# Patient Record
Sex: FEMALE | Race: WHITE | URBAN - METROPOLITAN AREA
[De-identification: names, ages, dates, MRNs, and addresses within clinical notes are randomized per-mention and may not be internally consistent; named-entity substitution may affect disease eponyms.]

---

## 2023-06-04 ENCOUNTER — OFFICE VISIT (OUTPATIENT)
Dept: URGENT CARE | Age: 23
End: 2023-06-04

## 2023-06-04 VITALS
SYSTOLIC BLOOD PRESSURE: 97 MMHG | OXYGEN SATURATION: 97 % | DIASTOLIC BLOOD PRESSURE: 62 MMHG | RESPIRATION RATE: 16 BRPM | BODY MASS INDEX: 20.57 KG/M2 | HEIGHT: 66 IN | TEMPERATURE: 98.1 F | WEIGHT: 128 LBS | HEART RATE: 71 BPM

## 2023-06-04 DIAGNOSIS — M79.672 LEFT FOOT PAIN: ICD-10-CM

## 2023-06-04 DIAGNOSIS — S92.155A CLOSED NONDISPLACED AVULSION FRACTURE OF LEFT TALUS, INITIAL ENCOUNTER: Primary | ICD-10-CM

## 2023-06-04 DIAGNOSIS — M25.572 ACUTE LEFT ANKLE PAIN: ICD-10-CM

## 2023-06-05 ASSESSMENT — ENCOUNTER SYMPTOMS: RESPIRATORY NEGATIVE: 1

## 2024-03-09 ENCOUNTER — OFFICE VISIT (OUTPATIENT)
Dept: URGENT CARE | Age: 24
End: 2024-03-09

## 2024-03-09 VITALS
BODY MASS INDEX: 20.5 KG/M2 | SYSTOLIC BLOOD PRESSURE: 106 MMHG | RESPIRATION RATE: 14 BRPM | HEART RATE: 87 BPM | OXYGEN SATURATION: 98 % | TEMPERATURE: 97.7 F | WEIGHT: 127 LBS | DIASTOLIC BLOOD PRESSURE: 71 MMHG

## 2024-03-09 DIAGNOSIS — M79.671 RIGHT FOOT PAIN: Primary | ICD-10-CM

## 2024-03-09 DIAGNOSIS — S90.30XA CONTUSION OF FOOT INCLUDING TOES, INITIAL ENCOUNTER: ICD-10-CM

## 2024-03-09 DIAGNOSIS — S90.129A CONTUSION OF FOOT INCLUDING TOES, INITIAL ENCOUNTER: ICD-10-CM

## 2024-03-09 ASSESSMENT — ENCOUNTER SYMPTOMS
SHORTNESS OF BREATH: 0
ABDOMINAL PAIN: 0
VOMITING: 0
DIARRHEA: 0
NAUSEA: 0
EYE PAIN: 0

## 2024-03-09 NOTE — PATIENT INSTRUCTIONS
- Pt to drink lots of fluids  - Pt ok to take tylenol and ibuprofen as needed  - Pt to call if any symptoms worsen or follow up with PCP  - Pt to go to ER if have shortness of breath or chest pain  - Pt to rest, elevate and ice

## 2024-03-09 NOTE — PROGRESS NOTES
Regina Garcia (: 2000) is a 23 y.o. female, Established patient, here for evaluation of the following chief complaint(s):  Toe Pain (Pt c/o right foot pain after stubbing foot yesterday. Pt c/o pain on 2nd, 3rd, 4th metatarsal. )      ASSESSMENT/PLAN:    ICD-10-CM    1. Right foot pain  M79.671 XR FOOT RIGHT (MIN 3 VIEWS)      2. Contusion of foot including toes, initial encounter  S90.30XA     S90.129A           - Impression of xray of right foot shows no fractures or dislocations. Low concern for cellulitis, septic joint, compartment syndrome, neurovascular compromise or sepsis.   - Pt to drink lots of fluids  - Pt ok to take tylenol and ibuprofen as needed  - Pt to call if any symptoms worsen or follow up with PCP  - Pt to go to ER if have shortness of breath or chest pain  - Pt to rest, elevate and ice right foot as needed    Follow up with your PCP within 5 days or, if unable, you can return to the clinic if symptoms persist beyond 5 days or if symptoms worsen.  The patient tolerated their visit well. The patient and/or the family were informed of the results of any tests, a time was given to answer questions, a plan was proposed and they agreed with plan. Reviewed AVS with treatment instructions and answered questions - pt/family expresses understanding and agreement with the discussed treatment plan and AVS instructions.    SUBJECTIVE/OBJECTIVE:  HPI:   23 y.o. female presents for complaint of yesterday she states she was walking in her house and she stubbed her right 2nd 3rd and 4th toes on a piece of furniture. Pt states now she has tenderness in those toes and the anterior aspect of her right foot. Pt denies any radiating pain up her right leg. Pt denies any numbness or tingling to the right toes. Pt states it hurts to bear weight on right foot. Pt denies any decreased ROM of right foot or ankle.     Has taken ibuprofen and have been icing the right foot as well. No past injuries to or